# Patient Record
Sex: FEMALE | Race: WHITE | HISPANIC OR LATINO | Employment: FULL TIME | URBAN - METROPOLITAN AREA
[De-identification: names, ages, dates, MRNs, and addresses within clinical notes are randomized per-mention and may not be internally consistent; named-entity substitution may affect disease eponyms.]

---

## 2018-02-28 ENCOUNTER — HOSPITAL ENCOUNTER (EMERGENCY)
Facility: MEDICAL CENTER | Age: 23
End: 2018-02-28
Attending: EMERGENCY MEDICINE
Payer: OTHER GOVERNMENT

## 2018-02-28 VITALS
RESPIRATION RATE: 14 BRPM | BODY MASS INDEX: 23.43 KG/M2 | TEMPERATURE: 98.5 F | WEIGHT: 124.12 LBS | HEART RATE: 92 BPM | HEIGHT: 61 IN | OXYGEN SATURATION: 100 % | DIASTOLIC BLOOD PRESSURE: 60 MMHG | SYSTOLIC BLOOD PRESSURE: 99 MMHG

## 2018-02-28 DIAGNOSIS — B34.9 VIRAL ILLNESS: ICD-10-CM

## 2018-02-28 LAB
APPEARANCE UR: CLEAR
COLOR UR AUTO: ABNORMAL
FLUAV RNA SPEC QL NAA+PROBE: NEGATIVE
FLUBV RNA SPEC QL NAA+PROBE: NEGATIVE
GLUCOSE UR QL STRIP.AUTO: NEGATIVE MG/DL
KETONES UR QL STRIP.AUTO: NEGATIVE MG/DL
LEUKOCYTE ESTERASE UR QL STRIP.AUTO: NEGATIVE
NITRITE UR QL STRIP.AUTO: NEGATIVE
PH UR STRIP.AUTO: 6.5 [PH]
PROT UR QL STRIP: ABNORMAL MG/DL
RBC UR QL AUTO: NEGATIVE
S PYO AG THROAT QL: NORMAL
S PYO AG THROAT QL: NORMAL
SIGNIFICANT IND 70042: NORMAL
SITE SITE: NORMAL
SOURCE SOURCE: NORMAL
SP GR UR: 1.02

## 2018-02-28 PROCEDURE — 87880 STREP A ASSAY W/OPTIC: CPT

## 2018-02-28 PROCEDURE — 700102 HCHG RX REV CODE 250 W/ 637 OVERRIDE(OP): Performed by: EMERGENCY MEDICINE

## 2018-02-28 PROCEDURE — 87081 CULTURE SCREEN ONLY: CPT

## 2018-02-28 PROCEDURE — 87502 INFLUENZA DNA AMP PROBE: CPT

## 2018-02-28 PROCEDURE — 99284 EMERGENCY DEPT VISIT MOD MDM: CPT

## 2018-02-28 PROCEDURE — A9270 NON-COVERED ITEM OR SERVICE: HCPCS | Performed by: EMERGENCY MEDICINE

## 2018-02-28 PROCEDURE — 700111 HCHG RX REV CODE 636 W/ 250 OVERRIDE (IP): Performed by: EMERGENCY MEDICINE

## 2018-02-28 PROCEDURE — 81002 URINALYSIS NONAUTO W/O SCOPE: CPT

## 2018-02-28 RX ORDER — IBUPROFEN 600 MG/1
600 TABLET ORAL ONCE
Status: COMPLETED | OUTPATIENT
Start: 2018-02-28 | End: 2018-02-28

## 2018-02-28 RX ORDER — ONDANSETRON 4 MG/1
4 TABLET, ORALLY DISINTEGRATING ORAL ONCE
Status: COMPLETED | OUTPATIENT
Start: 2018-02-28 | End: 2018-02-28

## 2018-02-28 RX ORDER — ONDANSETRON 4 MG/1
4 TABLET, ORALLY DISINTEGRATING ORAL EVERY 8 HOURS PRN
Qty: 10 TAB | Refills: 0 | Status: SHIPPED | OUTPATIENT
Start: 2018-02-28 | End: 2018-08-14

## 2018-02-28 RX ORDER — IBUPROFEN 600 MG/1
600 TABLET ORAL EVERY 6 HOURS PRN
COMMUNITY
End: 2018-08-14

## 2018-02-28 RX ADMIN — IBUPROFEN 600 MG: 600 TABLET, FILM COATED ORAL at 09:07

## 2018-02-28 RX ADMIN — ONDANSETRON 4 MG: 4 TABLET, ORALLY DISINTEGRATING ORAL at 09:07

## 2018-02-28 ASSESSMENT — PAIN SCALES - GENERAL
PAINLEVEL_OUTOF10: 6
PAINLEVEL_OUTOF10: 4

## 2018-02-28 ASSESSMENT — LIFESTYLE VARIABLES: DO YOU DRINK ALCOHOL: NO

## 2018-02-28 NOTE — ED NOTES
Pt verbalized understanding of DC and med instructions, pt with no further questions. Pt ambulate out of ED with steady gait.

## 2018-02-28 NOTE — ED PROVIDER NOTES
"ED Provider Note    CHIEF COMPLAINT  Chief Complaint   Patient presents with   • Sore Throat   • Head Ache   • Body Aches       HPI  Charisse Selby is a 22 y.o. female who presents complaining of headache, vomiting, abdominal cramping, myalgias, and sore throat.    Patient states she had \"the flu\" in January. She was seen at [facility] and diagnosed with this clinically. She states it took 1 week to recover. Last evening she developed similar symptoms and came to the ER for evaluation as she does not want to be sick for 1 week again.    Patient states she vomited one time yesterday. She took Motrin last evening. She denies photophobia, neck stiffness, rash, sick contacts, hematemesis, difficulty breathing, chest pain, dysuria, hematuria.      ALLERGIES  No Known Allergies    CURRENT MEDICATIONS  Home Medications     Reviewed by Rene Portillo R.N. (Registered Nurse) on 02/28/18 at 0836  Med List Status: Complete   Medication Last Dose Status   ibuprofen (MOTRIN) 600 MG Tab 2/27/2018 Active                PAST MEDICAL HISTORY     Recent presumed viral illness, possibly flu    SURGICAL HISTORY  patient denies any surgical history    SOCIAL HISTORY  Social History     Social History Main Topics   • Smoking status: Not on file   • Smokeless tobacco: Not on file   • Alcohol use Not on file   • Drug use: Unknown   • Sexual activity: Not on file     Works for the [employer]    REVIEW OF SYSTEMS  See HPI for further details.  All other systems are negative except as above in HPI.      PHYSICAL EXAM  VITAL SIGNS: /67   Pulse (!) 109   Temp 37.3 °C (99.1 °F) (Oral)   Resp 16   Ht 1.549 m (5' 1\")   Wt 56.3 kg (124 lb 1.9 oz)   SpO2 99%   BMI 23.45 kg/m²     General:  WDWN, nontoxic appearing in NAD; A+Ox3; V/S as above; tachycardic, afebrile  Skin: warm and dry; good color; no rash  HEENT: NCAT; EOMs intact; PERRL; no scleral icterus; oropharynx clear without exudate, trismus, drooling, uvular deviation  Neck: " FROM; no meningismus, no LAD  Cardiovascular: Regular heart rate and rhythm.  No murmurs, rubs, or gallops; pulses 2+ bilaterally radially  Lungs: Clear to auscultation with good air movement bilaterally.  No wheezes, rhonchi, or rales.   Abdomen: BS present; soft; NTND; no rebound, guarding, or rigidity.  No organomegaly or pulsatile mass; no CVAT   Extremities: FONSECA x 4; no e/o trauma  Neurologic: CNs III-XII grossly intact; speech clear; distal sensation intact; strength 5/5 UE/LEs; gait steady to bathroom  Psychiatric: Appropriate affect, normal mood    LABS  Results for orders placed or performed during the hospital encounter of 02/28/18   RAPID STREP, CULT IF INDICATED (CULTURE IF NEGATIVE)   Result Value Ref Range    Significant Indicator NEG     Source THRT     Site THROAT     Rapid Strep Screen       Negative for Group A streptococcus.  A negative result may be obtained if the specimen is  inadequate or antigen concentration is below the  sensitivity of the test. This negative test will be followed  up with a culture as requested.     INFLUENZA A/B BY PCR   Result Value Ref Range    Influenza virus A RNA Negative Negative    Influenza virus B, PCR Negative Negative   POC UA   Result Value Ref Range    POC Color Camelia     POC Appearance Clear     POC Glucose Negative Negative mg/dL    POC Ketones Negative Negative mg/dL    POC Specific Gravity 1.020 1.005 - 1.030    POC Blood Negative Negative    POC Urine PH 6.5 5.0 - 8.0    POC Protein Trace (A) Negative mg/dL    POC Nitrites Negative Negative    POC Leukocyte Esterase Negative Negative         MEDICAL RECORD  I have reviewed patient's medical record and pertinent results are listed below.      COURSE & MEDICAL DECISION MAKING  I have reviewed any medical record information, laboratory studies and radiographic results as noted.    Charisse Selby is a 22 y.o. female who presents complaining of  headache, vomiting, abdominal cramping, myalgias, and sore  throat. Given the constellation of symptoms and multiple systems involved, I suspect a viral process. Patient was tachycardic at triage but nontoxic appearing. She is able to ambulatory to the bathroom with me without apparent difficulty. She has a supple neck without meningismus. We tested for strep throat, influenza, and UTI. These were all negative.        Patient was given Zofran and a by mouth challenge. She was also given Motrin. Urinalysis, flu swab, and strep throat testing were performed.    Pt was re-evaluated   Patient continues to appear nontoxic. She is texting on her phone. I advised her of her testing results.  I have recommended Motrin/Tylenol, fluids, Zofran at home, and return to the ER for worsening symptoms, difficulty breathing, rash, stiff neck, or other concerns.      FINAL IMPRESSION  1. Viral illness             Electronically signed by: Suzi Levy, 2/28/2018 8:24 AM

## 2018-02-28 NOTE — DISCHARGE INSTRUCTIONS
"Drink plenty of fluids and rest  Take Zofran as needed for nausea/vomiting  Take Motrin and/or Tylenol as needed for symptoms  Return to the ER for difficulty breathing, chest pain, abdominal pain, rash, severe headache, or other concerns      Viral Syndrome  You or your child has Viral Syndrome. It is the most common infection causing \"colds\" and infections in the nose, throat, sinuses, and breathing tubes. Sometimes the infection causes nausea, vomiting, or diarrhea. The germ that causes the infection is a virus. No antibiotic or other medicine will kill it. There are medicines that you can take to make you or your child more comfortable.   HOME CARE INSTRUCTIONS   · Rest in bed until you start to feel better.   · If you have diarrhea or vomiting, eat small amounts of crackers and toast. Soup is helpful.   · Do not give aspirin or medicine that contains aspirin to children.   · Only take over-the-counter or prescription medicines for pain, discomfort, or fever as directed by your caregiver.   SEEK IMMEDIATE MEDICAL CARE IF:   · You or your child has not improved within one week.   · You or your child has pain that is not at least partially relieved by over-the-counter medicine.   · Thick, colored mucus or blood is coughed up.   · Discharge from the nose becomes thick yellow or green.   · Diarrhea or vomiting gets worse.   · There is any major change in your or your child's condition.   · You or your child develops a skin rash, stiff neck, severe headache, or are unable to hold down food or fluid.   · You or your child has an oral temperature above 102° F (38.9° C), not controlled by medicine.   · Your baby is older than 3 months with a rectal temperature of 102° F (38.9° C) or higher.   · Your baby is 3 months old or younger with a rectal temperature of 100.4° F (38° C) or higher.   Document Released: 12/03/2007 Document Revised: 03/11/2013 Document Reviewed: 12/03/2008  ExitCare® Patient Information ©2013 " [facility].

## 2018-02-28 NOTE — ED TRIAGE NOTES
Pt ambulates to triage, wearing mask  Chief Complaint   Patient presents with   • Sore Throat   • Head Ache   • Body Aches   symptoms started yesterday  Pt asked to wait in lobby, pt updated on triage process and pt asked to inform RN of any changes.

## 2018-03-02 LAB
S PYO SPEC QL CULT: NORMAL
SIGNIFICANT IND 70042: NORMAL
SITE SITE: NORMAL
SOURCE SOURCE: NORMAL

## 2018-08-14 ENCOUNTER — OFFICE VISIT (OUTPATIENT)
Dept: MEDICAL GROUP | Facility: PHYSICIAN GROUP | Age: 23
End: 2018-08-14
Payer: OTHER GOVERNMENT

## 2018-08-14 VITALS
SYSTOLIC BLOOD PRESSURE: 98 MMHG | WEIGHT: 119 LBS | BODY MASS INDEX: 22.47 KG/M2 | HEIGHT: 61 IN | HEART RATE: 81 BPM | TEMPERATURE: 98.2 F | RESPIRATION RATE: 16 BRPM | DIASTOLIC BLOOD PRESSURE: 58 MMHG | OXYGEN SATURATION: 97 %

## 2018-08-14 DIAGNOSIS — Z3A.09 9 WEEKS GESTATION OF PREGNANCY: ICD-10-CM

## 2018-08-14 DIAGNOSIS — Z3A.08 8 WEEKS GESTATION OF PREGNANCY: ICD-10-CM

## 2018-08-14 PROCEDURE — 99203 OFFICE O/P NEW LOW 30 MIN: CPT | Performed by: NURSE PRACTITIONER

## 2018-08-14 ASSESSMENT — PATIENT HEALTH QUESTIONNAIRE - PHQ9: CLINICAL INTERPRETATION OF PHQ2 SCORE: 0

## 2018-08-14 ASSESSMENT — PAIN SCALES - GENERAL: PAINLEVEL: NO PAIN

## 2018-08-14 NOTE — PROGRESS NOTES
"Chief Complaint   Patient presents with   • Pregnancy     x 2 months    • Labs Only     wants to have some done        HISTORY OF THE PRESENT ILLNESS: This is a 22 y.o. female new patient to our practice. This pleasant patient is here today to discuss pregnancy.    9 weeks gestation of pregnancy  HALLEY based on LMP 3/19/2018. States that she did notice a little bit of blood 2x, but states it was a small amount and stopped no cramping. States she took a repeat urine pregnancy which continues to be positive. States nausea has improved.  Patient states that she is eating a healthy diet states that she does exercise regularly.       History reviewed. No pertinent past medical history.    History reviewed. No pertinent surgical history.    Family Status   Relation Status   • Fa (Not Specified)   • PAunt (Not Specified)     Family History   Problem Relation Age of Onset   • Cancer Father         thyroid   • Cancer Paternal Aunt         brain, \"blood\"       Social History   Substance Use Topics   • Smoking status: Never Smoker   • Smokeless tobacco: Never Used   • Alcohol use No       Allergies: Patient has no known allergies.    No current Epic-ordered outpatient prescriptions on file.     No current Epic-ordered facility-administered medications on file.        Review of Systems   Constitutional:  Negative for fever, chills, weight loss and malaise/fatigue.   HENT:  Negative for ear pain, nosebleeds, congestion, sore throat and neck pain.    Eyes:  Negative for blurred vision.   Respiratory:  Negative for cough, sputum production, shortness of breath and wheezing.    Cardiovascular:  Negative for chest pain, palpitations, orthopnea and leg swelling.   Gastrointestinal:  Negative for heartburn, nausea, vomiting and abdominal pain.   Genitourinary:  Negative for dysuria, urgency and frequency.   Musculoskeletal:  Negative for myalgias, back pain and joint pain.   Skin:  Negative for rash and itching.   Neurological:  " "Negative for dizziness, tingling, tremors, sensory change, focal weakness and headaches.   Endo/Heme/Allergies:  Does not bruise/bleed easily.   Psychiatric/Behavioral:  Negative for depression, anxiety, or memory loss.     All other systems reviewed and are negative except as in HPI.    Exam: Blood pressure (!) 98/58, pulse 81, temperature 36.8 °C (98.2 °F), resp. rate 16, height 1.549 m (5' 1\"), weight 54 kg (119 lb), last menstrual period 06/11/2018, SpO2 97 %, not currently breastfeeding.  General:  Normal appearing. No distress.  HEENT:  Normocephalic. Eyes conjunctiva clear lids without ptosis, pupils equal and reactive to light accommodation, ears normal shape and contour, canals are clear bilaterally, tympanic membranes are benign, nasal mucosa benign, oropharynx is without erythema, edema or exudates.   Neck:  Supple without JVD or bruit. Thyroid is not enlarged.  Pulmonary:  Clear to ausculation.  Normal effort. No rales, ronchi, or wheezing.  Cardiovascular:  Regular rate and rhythm without murmur. Carotid and radial pulses are intact and equal bilaterally.  Neurologic:  Grossly nonfocal  Lymph:  No cervical, supraclavicular or axillary lymph nodes are palpable  Skin:  Warm and dry.  No obvious lesions.  Musculoskeletal:  Normal gait. No extremity cyanosis, clubbing, or edema.  Psych:  Normal mood and affect. Alert and oriented x3. Judgment and insight is normal.    PLAN:    1. 9 weeks gestation of pregnancy  The patient regarding healthy nutrition during pregnancy, discussed medications that she can and cannot take discussed contacting this provider with any questions  Patient will continue prenatal vitamins and follow-up with OB/GYN  - REFERRAL TO OB/GYN    Follow-up as needed. Patient is encouraged to be seen in the emergency room for chest pain, palpitations, shortness of breath, dizziness, severe abdominal pain or other concerning symptoms.      Please note that this dictation was created using voice " recognition software. I have made every reasonable attempt to correct obvious errors, but I expect that there are errors of grammar and possibly content that I did not discover before finalizing the note.      Assessment/Plan  1. 8 weeks gestation of pregnancy     2. 9 weeks gestation of pregnancy  REFERRAL TO OB/GYN         I have placed the below orders and discussed them with an approved delegating provider. The MA is performing the below orders under the direction of Dr. Goldman.

## 2018-08-14 NOTE — ASSESSMENT & PLAN NOTE
HALLEY based on LMP 3/19/2018. States that she did notice a little bit of blood 2x, but states it was a small amount and stopped no cramping. States she took a repeat urine pregnancy which continues to be positive. States nausea has improved.  Patient states that she is eating a healthy diet states that she does exercise regularly.

## 2018-08-17 ENCOUNTER — TELEPHONE (OUTPATIENT)
Dept: MEDICAL GROUP | Facility: PHYSICIAN GROUP | Age: 23
End: 2018-08-17

## 2018-08-17 DIAGNOSIS — Z3A.08 8 WEEKS GESTATION OF PREGNANCY: ICD-10-CM

## 2018-08-17 NOTE — TELEPHONE ENCOUNTER
1. Caller Name: Charisse Selby                                           Call Back Number: [phone] (home)         Patient approves a detailed voicemail message: N\A  Patient was sent to OBGYN office. Patient said the office she was sent to does not see pregnant women. Patient wants to be seen at [facility]. LM

## 2018-09-26 ENCOUNTER — HOSPITAL ENCOUNTER (OUTPATIENT)
Facility: MEDICAL CENTER | Age: 23
End: 2018-09-26
Attending: OBSTETRICS & GYNECOLOGY
Payer: OTHER GOVERNMENT

## 2018-09-26 ENCOUNTER — GYNECOLOGY VISIT (OUTPATIENT)
Dept: OBGYN | Facility: CLINIC | Age: 23
End: 2018-09-26
Payer: OTHER GOVERNMENT

## 2018-09-26 VITALS
HEIGHT: 61 IN | SYSTOLIC BLOOD PRESSURE: 112 MMHG | WEIGHT: 117 LBS | DIASTOLIC BLOOD PRESSURE: 66 MMHG | BODY MASS INDEX: 22.09 KG/M2

## 2018-09-26 DIAGNOSIS — Z34.81 PRENATAL CARE, SUBSEQUENT PREGNANCY IN FIRST TRIMESTER: ICD-10-CM

## 2018-09-26 DIAGNOSIS — N93.8 DUB (DYSFUNCTIONAL UTERINE BLEEDING): ICD-10-CM

## 2018-09-26 PROCEDURE — 87624 HPV HI-RISK TYP POOLED RSLT: CPT

## 2018-09-26 PROCEDURE — 99203 OFFICE O/P NEW LOW 30 MIN: CPT | Mod: 25 | Performed by: OBSTETRICS & GYNECOLOGY

## 2018-09-26 PROCEDURE — 88175 CYTOPATH C/V AUTO FLUID REDO: CPT

## 2018-09-26 PROCEDURE — 87491 CHLMYD TRACH DNA AMP PROBE: CPT

## 2018-09-26 PROCEDURE — 76815 OB US LIMITED FETUS(S): CPT | Performed by: OBSTETRICS & GYNECOLOGY

## 2018-09-26 PROCEDURE — 87591 N.GONORRHOEAE DNA AMP PROB: CPT

## 2018-09-26 NOTE — PROCEDURES
Ultrasound :      Per my Read   Transabdominal    Second/third trimester findings: BPD: 2.97 cm, Placenta localization: anterior, Fetal presentation: transverse, US HALLEY: 03/24/2019 and BPD/HC/AC/FL c/w 14w3 days  HALLEY dates 03/25/2019  HALLEY scan 03/24/2019   Positive cardiac / fetal movement

## 2018-09-26 NOTE — PROGRESS NOTES
"Charisse Selby,  23 y.o.  female presents today with a C/O of :oligomenorrhea. Pt   No LMP recorded.      2018 ????      Subjective : Nausea/Vomiting: Sometimes:  Abdominal /pelvic cramping : No :   vaginal bleeding:Sometimes, dark discharge on wiping ,. no cramps      Menstrual Flow : moderate   GYN ROS:  normal menses, no abnormal bleeding, pelvic pain or discharge, no breast pain or new or enlarging lumps on self exam      No past medical history on file.    No past surgical history on file.    Current Birth control:  none    OB History    Para Term  AB Living   2       1     SAB TAB Ectopic Molar Multiple Live Births   1                # Outcome Date GA Lbr Jerrod/2nd Weight Sex Delivery Anes PTL Lv   2             1 SAB                       Allergy:      Patient has no known allergies.    Exam;    /66 (BP Location: Right arm, Patient Position: Sitting)   Ht 1.549 m (5' 1\")   Wt 53.1 kg (117 lb)   BMI 22.11 kg/m²   well-appearing, well-hydrated, well-nourished  normal;   PERRLA, EOMI, fundi grossly normal, no papilledema, no AV nicking, sclera clear  Clear to auscultation  RRR No M  abdomen is soft without significant tenderness, masses, organomegaly or guarding  deferred, External genitalia normal, Vagina normal without discharge, Urethra without abnormality or discharge, cervix normal in appearanceLab.    No results found for this or any previous visit (from the past 336 hour(s)).  Ultrasound :      Per my Read   Transabdominal    Second/third trimester findings: BPD: 2.97 cm, Placenta localization: anterior, Fetal presentation: transverse, US HALLEY: 2019 and BPD/HC/AC/FL c/w 14w3 days  HALLEY dates 2019  HALLEY scan 2019   Positive cardiac / fetal movement     Assessment:    dysfunctional uterine bleeding    Plan:  2 weeks for NOB   Pap         "

## 2018-09-27 DIAGNOSIS — Z34.81 PRENATAL CARE, SUBSEQUENT PREGNANCY IN FIRST TRIMESTER: ICD-10-CM

## 2018-09-27 LAB — AMBIGUOUS DTTM AMBI4: NORMAL

## 2018-09-28 LAB
C TRACH DNA GENITAL QL NAA+PROBE: NEGATIVE
CYTOLOGY REG CYTOL: NORMAL
HPV HR 12 DNA CVX QL NAA+PROBE: NEGATIVE
HPV16 DNA SPEC QL NAA+PROBE: NEGATIVE
HPV18 DNA SPEC QL NAA+PROBE: NEGATIVE
N GONORRHOEA DNA GENITAL QL NAA+PROBE: NEGATIVE
SPECIMEN SOURCE: NORMAL
SPECIMEN SOURCE: NORMAL

## 2018-10-15 ENCOUNTER — INITIAL PRENATAL (OUTPATIENT)
Dept: OBGYN | Facility: CLINIC | Age: 23
End: 2018-10-15
Payer: OTHER GOVERNMENT

## 2018-10-15 VITALS — SYSTOLIC BLOOD PRESSURE: 108 MMHG | DIASTOLIC BLOOD PRESSURE: 68 MMHG | WEIGHT: 121.4 LBS | BODY MASS INDEX: 22.94 KG/M2

## 2018-10-15 DIAGNOSIS — Z36.3 ANTENATAL SCREENING FOR MALFORMATION USING ULTRASONICS: ICD-10-CM

## 2018-10-15 DIAGNOSIS — Z34.02 ENCOUNTER FOR SUPERVISION OF NORMAL FIRST PREGNANCY, SECOND TRIMESTER: ICD-10-CM

## 2018-10-15 PROCEDURE — 90471 IMMUNIZATION ADMIN: CPT | Performed by: OBSTETRICS & GYNECOLOGY

## 2018-10-15 PROCEDURE — 90686 IIV4 VACC NO PRSV 0.5 ML IM: CPT | Performed by: OBSTETRICS & GYNECOLOGY

## 2018-10-15 PROCEDURE — 90040 PR PRENATAL FOLLOW UP: CPT | Performed by: OBSTETRICS & GYNECOLOGY

## 2018-10-15 NOTE — PROGRESS NOTES
FLU SHOT GIVEN  NDC: 20781-382-33  LOT#: [id number]  Expiration Date: 2019    Dose: 0.5ML  Site: Right Arm    Patient educated on use and side effects of medication. Name and  verified prior to injection. Pt tolerated? YES     Verified by DV    Administered by Becky Emmanuel at 4:39 PM.    Patient Provided Medication: no

## 2018-10-15 NOTE — NON-PROVIDER
Patient here today for OB follow up @ 33bsh2g  Denies VB and LOF.  Pap 09/26/2018 WNL  C/O: None  Pharm verified

## 2018-10-15 NOTE — PROGRESS NOTES
S: Pt presents for routine OB follow up.  No contractions, vaginal bleeding, or leaking fluids. Good fetal movement.    Questions answered.    O: /68   Wt 55.1 kg (121 lb 6.4 oz)   LMP 2018   BMI 22.94 kg/m²   Patients' weight gain, fluid intake and exercise level discussed.  Vitals, fundal height , fetal position, and FHR reviewed on flowsheet    Lab:No results found for this or any previous visit (from the past 336 hour(s)).    A/P:  23 y.o.  at 17w1d presents for routine obstetric follow-up.  Size equals dates and/or scan    1.  Continue prenatal vitamins.  2.  Begin kick counts at 28 weeks.  3.  Exercise at least 30 minutes daily.  4.  Increase water intake.  5.  Follow-up in 4 weeks.  6.  Prenatal labs ordered  7.  Anatomy u/s ordered  8.  AFP tetra ordered

## 2018-10-22 ENCOUNTER — HOSPITAL ENCOUNTER (OUTPATIENT)
Dept: LAB | Facility: MEDICAL CENTER | Age: 23
End: 2018-10-22
Attending: OBSTETRICS & GYNECOLOGY
Payer: OTHER GOVERNMENT

## 2018-10-22 DIAGNOSIS — Z34.02 ENCOUNTER FOR SUPERVISION OF NORMAL FIRST PREGNANCY, SECOND TRIMESTER: ICD-10-CM

## 2018-10-22 DIAGNOSIS — Z36.3 ANTENATAL SCREENING FOR MALFORMATION USING ULTRASONICS: ICD-10-CM

## 2018-10-22 LAB
ABO GROUP BLD: NORMAL
APPEARANCE UR: CLEAR
BASOPHILS # BLD AUTO: 0.6 % (ref 0–1.8)
BASOPHILS # BLD: 0.05 K/UL (ref 0–0.12)
BILIRUB UR QL STRIP.AUTO: NEGATIVE
BLD GP AB SCN SERPL QL: NORMAL
COLOR UR: YELLOW
EOSINOPHIL # BLD AUTO: 0.11 K/UL (ref 0–0.51)
EOSINOPHIL NFR BLD: 1.3 % (ref 0–6.9)
ERYTHROCYTE [DISTWIDTH] IN BLOOD BY AUTOMATED COUNT: 47.7 FL (ref 35.9–50)
GLUCOSE UR STRIP.AUTO-MCNC: NEGATIVE MG/DL
HBV SURFACE AG SER QL: NEGATIVE
HCT VFR BLD AUTO: 39.3 % (ref 37–47)
HGB BLD-MCNC: 13.3 G/DL (ref 12–16)
HIV 1+2 AB+HIV1 P24 AG SERPL QL IA: NON REACTIVE
IMM GRANULOCYTES # BLD AUTO: 0.05 K/UL (ref 0–0.11)
IMM GRANULOCYTES NFR BLD AUTO: 0.6 % (ref 0–0.9)
KETONES UR STRIP.AUTO-MCNC: NEGATIVE MG/DL
LEUKOCYTE ESTERASE UR QL STRIP.AUTO: NEGATIVE
LYMPHOCYTES # BLD AUTO: 2.42 K/UL (ref 1–4.8)
LYMPHOCYTES NFR BLD: 28.2 % (ref 22–41)
MCH RBC QN AUTO: 32.8 PG (ref 27–33)
MCHC RBC AUTO-ENTMCNC: 33.8 G/DL (ref 33.6–35)
MCV RBC AUTO: 96.8 FL (ref 81.4–97.8)
MICRO URNS: ABNORMAL
MONOCYTES # BLD AUTO: 0.55 K/UL (ref 0–0.85)
MONOCYTES NFR BLD AUTO: 6.4 % (ref 0–13.4)
NEUTROPHILS # BLD AUTO: 5.41 K/UL (ref 2–7.15)
NEUTROPHILS NFR BLD: 62.9 % (ref 44–72)
NITRITE UR QL STRIP.AUTO: NEGATIVE
NRBC # BLD AUTO: 0 K/UL
NRBC BLD-RTO: 0 /100 WBC
PH UR STRIP.AUTO: 7.5 [PH]
PLATELET # BLD AUTO: 242 K/UL (ref 164–446)
PMV BLD AUTO: 12 FL (ref 9–12.9)
PROT UR QL STRIP: NEGATIVE MG/DL
RBC # BLD AUTO: 4.06 M/UL (ref 4.2–5.4)
RBC UR QL AUTO: NEGATIVE
RH BLD: NORMAL
RUBV AB SER QL: 22.5 IU/ML
SP GR UR STRIP.AUTO: 1.02
TREPONEMA PALLIDUM IGG+IGM AB [PRESENCE] IN SERUM OR PLASMA BY IMMUNOASSAY: NON REACTIVE
UROBILINOGEN UR STRIP.AUTO-MCNC: 0.2 MG/DL
WBC # BLD AUTO: 8.6 K/UL (ref 4.8–10.8)

## 2018-10-22 PROCEDURE — 87389 HIV-1 AG W/HIV-1&-2 AB AG IA: CPT

## 2018-10-22 PROCEDURE — 36415 COLL VENOUS BLD VENIPUNCTURE: CPT

## 2018-10-22 PROCEDURE — 86900 BLOOD TYPING SEROLOGIC ABO: CPT

## 2018-10-22 PROCEDURE — 86762 RUBELLA ANTIBODY: CPT

## 2018-10-22 PROCEDURE — 86901 BLOOD TYPING SEROLOGIC RH(D): CPT

## 2018-10-22 PROCEDURE — 85025 COMPLETE CBC W/AUTO DIFF WBC: CPT

## 2018-10-22 PROCEDURE — 87340 HEPATITIS B SURFACE AG IA: CPT

## 2018-10-22 PROCEDURE — 81003 URINALYSIS AUTO W/O SCOPE: CPT

## 2018-10-22 PROCEDURE — 81511 FTL CGEN ABNOR FOUR ANAL: CPT

## 2018-10-22 PROCEDURE — 86780 TREPONEMA PALLIDUM: CPT

## 2018-10-22 PROCEDURE — 86850 RBC ANTIBODY SCREEN: CPT

## 2018-10-25 LAB
# FETUSES US: NORMAL
AFP MOM SERPL: 1.06
AFP SERPL-MCNC: 53 NG/ML
AGE - REPORTED: 23.5 YR
CURRENT SMOKER: NO
FAMILY MEMBER DISEASES HX: NO
GA METHOD: NORMAL
GA: NORMAL WK
HCG MOM SERPL: 0.68
HCG SERPL-ACNC: NORMAL IU/L
HX OF HEREDITARY DISORDERS: NO
IDDM PATIENT QL: NO
INHIBIN A MOM SERPL: 0.9
INHIBIN A SERPL-MCNC: 155 PG/ML
INTEGRATED SCN PATIENT-IMP: NORMAL
PATHOLOGY STUDY: NORMAL
SPECIMEN DRAWN SERPL: NORMAL
U ESTRIOL MOM SERPL: 2.22
U ESTRIOL SERPL-MCNC: 3.52 NG/ML

## 2018-11-05 ENCOUNTER — HOSPITAL ENCOUNTER (OUTPATIENT)
Dept: RADIOLOGY | Facility: MEDICAL CENTER | Age: 23
End: 2018-11-05
Attending: OBSTETRICS & GYNECOLOGY
Payer: OTHER GOVERNMENT

## 2018-11-05 ENCOUNTER — DATING (OUTPATIENT)
Dept: OBGYN | Facility: CLINIC | Age: 23
End: 2018-11-05

## 2018-11-05 DIAGNOSIS — Z34.02 ENCOUNTER FOR SUPERVISION OF NORMAL FIRST PREGNANCY, SECOND TRIMESTER: ICD-10-CM

## 2018-11-05 DIAGNOSIS — Z36.3 ANTENATAL SCREENING FOR MALFORMATION USING ULTRASONICS: ICD-10-CM

## 2018-11-05 PROCEDURE — 76805 OB US >/= 14 WKS SNGL FETUS: CPT

## 2018-11-12 ENCOUNTER — ROUTINE PRENATAL (OUTPATIENT)
Dept: OBGYN | Facility: CLINIC | Age: 23
End: 2018-11-12
Payer: OTHER GOVERNMENT

## 2018-11-12 VITALS — DIASTOLIC BLOOD PRESSURE: 70 MMHG | WEIGHT: 126.4 LBS | BODY MASS INDEX: 23.88 KG/M2 | SYSTOLIC BLOOD PRESSURE: 110 MMHG

## 2018-11-12 DIAGNOSIS — Z34.02 ENCOUNTER FOR SUPERVISION OF NORMAL FIRST PREGNANCY, SECOND TRIMESTER: ICD-10-CM

## 2018-11-12 PROCEDURE — 90040 PR PRENATAL FOLLOW UP: CPT | Performed by: OBSTETRICS & GYNECOLOGY

## 2018-11-12 NOTE — PROGRESS NOTES
CAITLYN:  21w1d    Pt reports doing well.  Has not yet felt fetal movement.  Has had some itching of her ears.  Denies vaginal bleeding, contractions, LOF.    /70   Wt 57.3 kg (126 lb 6.4 oz)   LMP 2018   BMI 23.88 kg/m²   gen: AAO, NAD  FHTs: 155  FH: 21    A/P: 23 y.o.  @ 21w1d   #PNL up to date, Rh+; anatomy US discussed with patient today-within normal limits patient does not want to know gender  #Has had flu, will need CBC/glucose screening ordered at next visit  #Plans to BF - classes discussed    RTC 4wks

## 2018-11-12 NOTE — PROGRESS NOTES
Patient here today for OB follow up @ 52mnc9h  Denies VB and LOF.  C/O: Itching in neck and breast  White discharge from left breast  Pharm verified  Good # [phone]

## 2018-12-06 ENCOUNTER — HOSPITAL ENCOUNTER (OUTPATIENT)
Facility: MEDICAL CENTER | Age: 23
End: 2018-12-07
Attending: OBSTETRICS & GYNECOLOGY | Admitting: OBSTETRICS & GYNECOLOGY
Payer: OTHER GOVERNMENT

## 2018-12-06 ENCOUNTER — APPOINTMENT (OUTPATIENT)
Dept: RADIOLOGY | Facility: MEDICAL CENTER | Age: 23
End: 2018-12-06
Attending: OBSTETRICS & GYNECOLOGY
Payer: OTHER GOVERNMENT

## 2018-12-06 ENCOUNTER — HOSPITAL ENCOUNTER (EMERGENCY)
Facility: MEDICAL CENTER | Age: 23
End: 2019-01-12
Payer: OTHER GOVERNMENT

## 2018-12-06 VITALS
WEIGHT: 130 LBS | TEMPERATURE: 97.6 F | HEIGHT: 61 IN | SYSTOLIC BLOOD PRESSURE: 106 MMHG | HEART RATE: 96 BPM | DIASTOLIC BLOOD PRESSURE: 69 MMHG | BODY MASS INDEX: 24.55 KG/M2

## 2018-12-06 ASSESSMENT — PAIN SCALES - GENERAL
PAINLEVEL_OUTOF10: 4
PAINLEVEL: 4

## 2018-12-07 PROCEDURE — 76815 OB US LIMITED FETUS(S): CPT

## 2018-12-07 ASSESSMENT — PAIN SCALES - GENERAL: PAINLEVEL_OUTOF10: 4

## 2018-12-07 NOTE — PROGRESS NOTES
, EDC 3/24, GA 24w4d. Pt presents to L&D with c/o pain and cramping in her LLQ after slipping on ice and falling on her abdomen. Pt denies LOF and VB, UCs and reports + Fetal movement before falling. Pt escorted to triage room, oriented to room and unit, and external monitors applied. POC discussed with pt and s/o and encouraged to state needs or questions at any time.    2334 Dr. Olivier on unit, report given. Orders received.    2344 Report given to COLBY Bartlett RN at bedside, pt transferred to antepartum room ambulatory.

## 2018-12-07 NOTE — PROGRESS NOTES
2344 Report received from Julia VEGA. POC discussed. Pt ambulated from [id number] to antepartum room 232 with steady gait.   0009 US at bedside.   0034 US left bedside. External monitors replaced.  0200 Dr. Olivier in department. Orders received to d/c pt if pt does not exceed one UC per ten min.   0240 Tracing maternal HR. US readjusted.  0310 Tracing maternal HR. US readjusted.  0330 LEE Ndiaye RN reviewed strip. Approves of pt to be d/c home.   037 RN at bedside. Tracing Maternal HR, US readjusted.   0340 Discharge instructions given including labor precautions, UCs, ROM, VB, abn fm/fetal kick counts, fluid intake, when to return to L&D and follow up with TPC. Questions answered and Pt verbalized agreement with POC and discharge. Discharge paperwork signed. Pt discharged via ambulation in stable condition with FOB and personal belongings.

## 2018-12-10 ENCOUNTER — ROUTINE PRENATAL (OUTPATIENT)
Dept: OBGYN | Facility: CLINIC | Age: 23
End: 2018-12-10
Payer: OTHER GOVERNMENT

## 2018-12-10 VITALS — BODY MASS INDEX: 25.32 KG/M2 | WEIGHT: 134 LBS | SYSTOLIC BLOOD PRESSURE: 114 MMHG | DIASTOLIC BLOOD PRESSURE: 70 MMHG

## 2018-12-10 DIAGNOSIS — R23.8 OTHER SKIN CHANGES: ICD-10-CM

## 2018-12-10 DIAGNOSIS — Z34.82 PRENATAL CARE, SUBSEQUENT PREGNANCY IN SECOND TRIMESTER: ICD-10-CM

## 2018-12-10 PROCEDURE — 90040 PR PRENATAL FOLLOW UP: CPT | Performed by: OBSTETRICS & GYNECOLOGY

## 2018-12-11 NOTE — PROGRESS NOTES
S: Pt presents for routine OB follow up.  No contractions, vaginal bleeding, or leaking fluids. Reports Good fetal movements.  Patient fell on Saturday and was seen in labor and delivery and evaluated.  She had normal ultrasound.  Patient has some lower pelvic soreness which is improving.  She has no other concerns today.  Patient is concerned for possible eczema and requested referral to dermatology.  Referral was placed.    Questions answered.    O: /70   Wt 60.8 kg (134 lb)   LMP 2018   BMI 25.32 kg/m²   Patients' weight gain, fluid intake and exercise level discussed.  Vitals, fundal height , fetal position, and FHR reviewed on flowsheet    Abdomen is soft and nontender  Uterus is nontender and exam  Fundal height 26 cm  Fetal heart rate 140s      Lab:No results found for this or any previous visit (from the past 336 hour(s)).    A/P:  23 y.o.  at 25w1d presents for routine obstetric follow-up.  Size equals dates     1.  Continue prenatal vitamins.  2.  Begin kick counts at 28 weeks.  3.  Exercise at least 30 minutes daily.  4.  Drink at least 2 Liters of water daily  5.  Follow-up in 3 weeks.  6.  Pregnancy precautions and plan of care reviewed  7.  28-week labs ordered.  Patient to do labs prior to next visit

## 2018-12-19 ENCOUNTER — APPOINTMENT (OUTPATIENT)
Dept: LAB | Facility: MEDICAL CENTER | Age: 23
End: 2018-12-19
Attending: OBSTETRICS & GYNECOLOGY
Payer: OTHER GOVERNMENT

## 2018-12-19 PROCEDURE — 85018 HEMOGLOBIN: CPT

## 2018-12-19 PROCEDURE — 82950 GLUCOSE TEST: CPT

## 2018-12-19 PROCEDURE — 85014 HEMATOCRIT: CPT

## 2018-12-19 PROCEDURE — 36415 COLL VENOUS BLD VENIPUNCTURE: CPT

## 2018-12-19 PROCEDURE — 86780 TREPONEMA PALLIDUM: CPT

## 2018-12-21 ENCOUNTER — ROUTINE PRENATAL (OUTPATIENT)
Dept: OBGYN | Facility: CLINIC | Age: 23
End: 2018-12-21
Payer: OTHER GOVERNMENT

## 2018-12-21 VITALS — BODY MASS INDEX: 24.56 KG/M2 | WEIGHT: 130 LBS | DIASTOLIC BLOOD PRESSURE: 60 MMHG | SYSTOLIC BLOOD PRESSURE: 110 MMHG

## 2018-12-21 DIAGNOSIS — O99.340 DEPRESSION AFFECTING PREGNANCY: ICD-10-CM

## 2018-12-21 DIAGNOSIS — F32.A DEPRESSION AFFECTING PREGNANCY: ICD-10-CM

## 2018-12-21 PROCEDURE — 90040 PR PRENATAL FOLLOW UP: CPT | Performed by: OBSTETRICS & GYNECOLOGY

## 2018-12-21 NOTE — PROGRESS NOTES
OB Follow Up--- High Risk  Patient appears depressed ,( denies SI/HI )          23 y.o. is a 26w5d presents in prenatal follow up.    Subjective:no complaints  . Fetal Movement  positive    Problem List:has Encounter for supervision of normal first pregnancy, second trimester on her problem list.     Objective:   /60   Wt 59 kg (130 lb)   LMP 06/18/2018   BMI 24.56 kg/m²     Abdomen:  S=D  Extremities:Normal        Lab:No results found for this or any previous visit (from the past 336 hour(s)).      Assessment:    26w5d     Some depression   Early pregnancy warning signs (bleeding,pain,discharge,leaking) discussed.    Plan:  1 week to re assess   Zoloft  50 mg   Retrospective PHQ-9 > 6 : thus mild depression   Continue water intake  Ambulate nightly after 37 weeks  Continue Kick counts

## 2018-12-21 NOTE — PROGRESS NOTES
Pt here today for OB follow up  Pt denies leaking fluids, vaginal blood, or pain  Reports +FM  Pharmacy Confirmed.

## 2018-12-28 ENCOUNTER — APPOINTMENT (OUTPATIENT)
Dept: OBGYN | Facility: CLINIC | Age: 23
End: 2018-12-28
Payer: OTHER GOVERNMENT

## 2019-01-14 ENCOUNTER — ROUTINE PRENATAL (OUTPATIENT)
Dept: OBGYN | Facility: CLINIC | Age: 24
End: 2019-01-14
Payer: OTHER GOVERNMENT

## 2019-01-14 VITALS — DIASTOLIC BLOOD PRESSURE: 66 MMHG | WEIGHT: 136 LBS | BODY MASS INDEX: 25.7 KG/M2 | SYSTOLIC BLOOD PRESSURE: 100 MMHG

## 2019-01-14 DIAGNOSIS — Z34.00 SUPERVISION OF NORMAL FIRST PREGNANCY, ANTEPARTUM: Primary | ICD-10-CM

## 2019-01-14 PROCEDURE — 90471 IMMUNIZATION ADMIN: CPT | Performed by: NURSE PRACTITIONER

## 2019-01-14 PROCEDURE — 90715 TDAP VACCINE 7 YRS/> IM: CPT | Performed by: NURSE PRACTITIONER

## 2019-01-14 PROCEDURE — 90040 PR PRENATAL FOLLOW UP: CPT | Performed by: NURSE PRACTITIONER

## 2019-01-14 NOTE — LETTER
"Count Your Baby's Movements  Another step to a healthy delivery                 Dept: [phone]    How Many Weeks Pregnant? Unknown    Date to Begin Countin19              How to use this chart    One way for your physician to keep track of your baby's health is by knowing how often the baby moves (or \"kicks\") in your womb.  You can help your physician to do this by using this chart every day.    Every day, you should see how many hours it takes for your baby to move 10 times.  Start in the morning, as soon as you get up.    · First, write down the time your baby moves until you get to 10.  · Check off one box every time your baby moves until you get to 10.  · Write down the time you finished counting in the last column.  · Total how long it took to count up all 10 movements.  · Finally, fill in the box that shows how long this took.  After counting 10 movements, you no longer have to count any more that day.  The next morning, just start counting again as soon as you get up.    What should you call a \"movement\"?  It is hard to say, because it will feel different from one mother to another and from one pregnancy to the next.  The important thing is that you count the movements the same way throughout your pregnancy.  If you have more questions, you should ask your physician.    Count carefully every day!  SAMPLE:  Week 28    How many hours did it take to feel 10 movements?       Start  Time     1     2     3     4     5     6     7     8     9     10   Finish Time   Mon 8:20 ·  ·  ·  ·  ·  ·  ·  ·  ·  ·  11:40                  Sat               Sun                 IMPORTANT: You should contact your physician if it takes more than two hours for you to feel 10 movements.  Each morning, write down the time and start to count the movements of your baby.  Keep track by checking off one box every time you feel one movement.  When you have felt 10 \"kicks\", " write down the time you finished counting in the last column.  Then fill in the   box (over the check burton) for the number of hours it took.  Be sure to read the complete instructions on the previous page.

## 2019-01-14 NOTE — PROGRESS NOTES
OB f/u. + fetal movement.  No VB, LOF or UC's.  Wt: 136      BP:100/66  Preferred pharmacy confirmed.  Pt is experiencing pressure.  Tdap given   RADHA given    Medication:Tdap injection  Verified by Becky Emmanuel  Administered by Odessa Coyle at 3:13 PM.  Pt tolerated yes

## 2019-01-14 NOTE — PROGRESS NOTES
S) Pt is a 23 y.o.   at 30w1d  gestation. Routine prenatal care today. No complaints today. Patient will be transferring care to [place] in March. Discussed safe travel during pregnancy, encouraged driving as opposed to flying in the 3rd trimester. Glucose labs done 18, but no results received, contacted lab who has results, will load to Epic.  labor precautions discussed, all questions answered. Tdap today.   Fetal movement Normal  Cramping no  VB no  LOF no   Denies dysuria. Generally feels well today. Good self-care activities identified. Denies headaches, swelling, visual changes, or epigastric pain .     O) Blood pressure 100/66, weight 61.7 kg (136 lb), last menstrual period 2018, not currently breastfeeding.        Labs:       PNL: WNL       GCT: Awaiting lab results        AFP: normal       GBS: N/A       Pertinent ultrasound -        18- Survey WNL, KASIE 13.6cm, c/w prev dating.    A) IUP at 30w1d       S=D         Patient Active Problem List    Diagnosis Date Noted   • Depression affecting pregnancy 2018   • Supervision of normal first pregnancy, antepartum 10/15/2018          SVE: deferred       Chaperone offered: n/a         TDAP: yes       FLU: yes        BTL: no       : n/a       C/S Consent: n/a       IOL or C/S scheduled: no       LAST PAP: 18- WNL         P) s/s ptl vs general discomforts. Fetal movements reviewed. General ed and anticipatory guidance. Nutrition/exercise/vitamin. Plans breast Plans pp contraception- unsure  Continue PNV.

## 2019-01-20 ENCOUNTER — HOSPITAL ENCOUNTER (OUTPATIENT)
Facility: MEDICAL CENTER | Age: 24
End: 2019-01-20
Attending: OBSTETRICS & GYNECOLOGY | Admitting: OBSTETRICS & GYNECOLOGY
Payer: OTHER GOVERNMENT

## 2019-01-20 VITALS
HEART RATE: 94 BPM | WEIGHT: 136 LBS | TEMPERATURE: 98 F | BODY MASS INDEX: 25.68 KG/M2 | DIASTOLIC BLOOD PRESSURE: 52 MMHG | HEIGHT: 61 IN | SYSTOLIC BLOOD PRESSURE: 91 MMHG | RESPIRATION RATE: 18 BRPM

## 2019-01-20 DIAGNOSIS — Z34.82 PRENATAL CARE, SUBSEQUENT PREGNANCY IN SECOND TRIMESTER: ICD-10-CM

## 2019-01-20 PROCEDURE — 700102 HCHG RX REV CODE 250 W/ 637 OVERRIDE(OP): Performed by: OBSTETRICS & GYNECOLOGY

## 2019-01-20 PROCEDURE — 81002 URINALYSIS NONAUTO W/O SCOPE: CPT

## 2019-01-20 PROCEDURE — A9270 NON-COVERED ITEM OR SERVICE: HCPCS | Performed by: OBSTETRICS & GYNECOLOGY

## 2019-01-20 PROCEDURE — 59025 FETAL NON-STRESS TEST: CPT

## 2019-01-20 RX ORDER — NIFEDIPINE 10 MG/1
20 CAPSULE ORAL ONCE
Status: COMPLETED | OUTPATIENT
Start: 2019-01-20 | End: 2019-01-20

## 2019-01-20 RX ADMIN — NIFEDIPINE 20 MG: 10 CAPSULE, LIQUID FILLED ORAL at 19:34
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
## 2019-01-21 LAB
APPEARANCE UR: ABNORMAL
COLOR UR AUTO: YELLOW
GLUCOSE UR QL STRIP.AUTO: NEGATIVE MG/DL
KETONES UR QL STRIP.AUTO: NEGATIVE MG/DL
LEUKOCYTE ESTERASE UR QL STRIP.AUTO: NEGATIVE
NITRITE UR QL STRIP.AUTO: POSITIVE
PH UR STRIP.AUTO: 6.5 [PH]
PROT UR QL STRIP: ABNORMAL MG/DL
RBC UR QL AUTO: NEGATIVE
SP GR UR: 1.01
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
## 2019-01-21 NOTE — PROGRESS NOTES
"Report received and plan of care discussed.  Pt states she feels the baby moving, feels \"crampy\" Denies vaginal bleeding or leaking, states back pain is sore and her face is sore.  2030--SVE done without change. Pt's UC's have quieted down.  Pt drinking a lot of water. 2050--Dr. Camejo informed of patient status.  Prescription given to patient for Macrobid and patient discharged undelivered into safe situation with friends.  Ambulated out for home undelivered in stable condition with instructions.  "

## 2019-01-23 ENCOUNTER — ROUTINE PRENATAL (OUTPATIENT)
Dept: OBGYN | Facility: CLINIC | Age: 24
End: 2019-01-23
Payer: OTHER GOVERNMENT

## 2019-01-23 VITALS — SYSTOLIC BLOOD PRESSURE: 112 MMHG | WEIGHT: 138 LBS | BODY MASS INDEX: 26.09 KG/M2 | DIASTOLIC BLOOD PRESSURE: 66 MMHG

## 2019-01-23 DIAGNOSIS — T74.91XA DOMESTIC VIOLENCE OF ADULT, INITIAL ENCOUNTER: ICD-10-CM

## 2019-01-23 DIAGNOSIS — Z34.00 SUPERVISION OF NORMAL FIRST PREGNANCY, ANTEPARTUM: ICD-10-CM

## 2019-01-23 PROCEDURE — 90040 PR PRENATAL FOLLOW UP: CPT | Performed by: NURSE PRACTITIONER

## 2019-01-24 RX ORDER — NITROFURANTOIN 25; 75 MG/1; MG/1
100 CAPSULE ORAL 2 TIMES DAILY
Qty: 14 CAP | Refills: 0 | Status: SHIPPED | OUTPATIENT
Start: 2019-01-24 | End: 2019-02-28
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
## 2019-02-01 ENCOUNTER — ROUTINE PRENATAL (OUTPATIENT)
Dept: OBGYN | Facility: CLINIC | Age: 24
End: 2019-02-01
Payer: OTHER GOVERNMENT

## 2019-02-01 VITALS — WEIGHT: 139 LBS | BODY MASS INDEX: 26.28 KG/M2 | DIASTOLIC BLOOD PRESSURE: 68 MMHG | SYSTOLIC BLOOD PRESSURE: 100 MMHG

## 2019-02-01 DIAGNOSIS — Z34.00 SUPERVISION OF NORMAL FIRST PREGNANCY, ANTEPARTUM: Primary | ICD-10-CM

## 2019-02-01 PROCEDURE — 90040 PR PRENATAL FOLLOW UP: CPT | Performed by: NURSE PRACTITIONER

## 2019-02-01 NOTE — PATIENT INSTRUCTIONS
P:  1.  GBS @ 35 wks.          2.  Continue FKCs.          3.  Questions answered.          4.  Encouraged pt to tour L&D.          5.  Encourage adequate water intake.        6.  F/u 2 wks.        7.  PP contraception unsure.        8.  D/w pt helps for pelvic pain, rec'd preg belt.         9.  Encouraged pt to establish w OB in [facility] and we will send records.

## 2019-02-01 NOTE — PROGRESS NOTES
Pt here today for OB follow up  Pt states pt has finished her antibiotics 2 days ago. She no longer has symptoms  Reports +FM  Pharmacy Confirmed.  Chaperone offered and declined.
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
## 2019-02-12 ENCOUNTER — ROUTINE PRENATAL (OUTPATIENT)
Dept: OBGYN | Facility: CLINIC | Age: 24
End: 2019-02-12
Payer: OTHER GOVERNMENT

## 2019-02-12 VITALS — SYSTOLIC BLOOD PRESSURE: 100 MMHG | DIASTOLIC BLOOD PRESSURE: 60 MMHG | WEIGHT: 141 LBS | BODY MASS INDEX: 26.66 KG/M2

## 2019-02-12 DIAGNOSIS — Z34.00 SUPERVISION OF NORMAL FIRST PREGNANCY, ANTEPARTUM: Primary | ICD-10-CM

## 2019-02-12 DIAGNOSIS — R35.0 FREQUENT URINATION: ICD-10-CM

## 2019-02-12 LAB
APPEARANCE UR: CLEAR
BILIRUB UR STRIP-MCNC: NORMAL MG/DL
COLOR UR AUTO: YELLOW
GLUCOSE UR STRIP.AUTO-MCNC: NORMAL MG/DL
KETONES UR STRIP.AUTO-MCNC: NORMAL MG/DL
LEUKOCYTE ESTERASE UR QL STRIP.AUTO: NORMAL
NITRITE UR QL STRIP.AUTO: NORMAL
PH UR STRIP.AUTO: 7 [PH] (ref 5–8)
PROT UR QL STRIP: NORMAL MG/DL
RBC UR QL AUTO: NORMAL
SP GR UR STRIP.AUTO: 1.01
UROBILINOGEN UR STRIP-MCNC: NORMAL MG/DL

## 2019-02-12 PROCEDURE — 81002 URINALYSIS NONAUTO W/O SCOPE: CPT | Performed by: NURSE PRACTITIONER

## 2019-02-12 PROCEDURE — 90040 PR PRENATAL FOLLOW UP: CPT | Performed by: NURSE PRACTITIONER
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
## 2019-02-12 NOTE — PROGRESS NOTES
OB f/u. + fetal movement.  No VB, LOF or UC's.  Wt:    141lb   BP:  Pt is experiencing frequent urination and burning while she urinates  Preferred pharmacy confirmed.

## 2019-02-28 ENCOUNTER — HOSPITAL ENCOUNTER (OUTPATIENT)
Facility: MEDICAL CENTER | Age: 24
End: 2019-02-28
Attending: ADVANCED PRACTICE MIDWIFE
Payer: OTHER GOVERNMENT

## 2019-02-28 ENCOUNTER — APPOINTMENT (OUTPATIENT)
Dept: OBGYN | Facility: CLINIC | Age: 24
End: 2019-02-28
Payer: OTHER GOVERNMENT

## 2019-02-28 ENCOUNTER — ROUTINE PRENATAL (OUTPATIENT)
Dept: OBGYN | Facility: CLINIC | Age: 24
End: 2019-02-28
Payer: OTHER GOVERNMENT

## 2019-02-28 VITALS — BODY MASS INDEX: 27.03 KG/M2 | SYSTOLIC BLOOD PRESSURE: 118 MMHG | WEIGHT: 143 LBS | DIASTOLIC BLOOD PRESSURE: 60 MMHG

## 2019-02-28 DIAGNOSIS — Z34.83 ENCOUNTER FOR SUPERVISION OF OTHER NORMAL PREGNANCY, THIRD TRIMESTER: ICD-10-CM

## 2019-02-28 PROCEDURE — 87081 CULTURE SCREEN ONLY: CPT

## 2019-02-28 PROCEDURE — 87150 DNA/RNA AMPLIFIED PROBE: CPT

## 2019-02-28 PROCEDURE — 90040 PR PRENATAL FOLLOW UP: CPT | Performed by: ADVANCED PRACTICE MIDWIFE

## 2019-02-28 NOTE — PROGRESS NOTES
Pt here today for OB follow up @ 36w4d  Pt denies vaginal bleeding or cramps/ contractions  Reports +FM  Good # verified  Pharmacy Confirmed.  GBS Today

## 2019-02-28 NOTE — PROGRESS NOTES
Patient here for CAITLY[facility] visit. Affirms fetal movement, denies vaginal bleeding or camping. Explained in detail to patient what contractions are labor precautions. Explained GBS in detail and this was collected today. Patient verbalizes understanding. She is transferring care to [place]. Travel recommendations reviewed. She also has appointment in one week set up with them. RTC PRN.

## 2019-03-02 LAB — GP B STREP DNA SPEC QL NAA+PROBE: POSITIVE

## 2019-03-07 ENCOUNTER — APPOINTMENT (OUTPATIENT)
Dept: OBGYN | Facility: CLINIC | Age: 24
End: 2019-03-07
Payer: OTHER GOVERNMENT